# Patient Record
Sex: FEMALE | Race: OTHER
[De-identification: names, ages, dates, MRNs, and addresses within clinical notes are randomized per-mention and may not be internally consistent; named-entity substitution may affect disease eponyms.]

---

## 2018-05-31 ENCOUNTER — HOSPITAL ENCOUNTER (OUTPATIENT)
Dept: HOSPITAL 61 - ENDOS | Age: 53
Discharge: HOME | End: 2018-05-31
Attending: INTERNAL MEDICINE
Payer: COMMERCIAL

## 2018-05-31 VITALS
DIASTOLIC BLOOD PRESSURE: 77 MMHG | DIASTOLIC BLOOD PRESSURE: 77 MMHG | DIASTOLIC BLOOD PRESSURE: 77 MMHG | SYSTOLIC BLOOD PRESSURE: 146 MMHG | SYSTOLIC BLOOD PRESSURE: 146 MMHG | SYSTOLIC BLOOD PRESSURE: 146 MMHG

## 2018-05-31 DIAGNOSIS — Z90.49: ICD-10-CM

## 2018-05-31 DIAGNOSIS — Z83.3: ICD-10-CM

## 2018-05-31 DIAGNOSIS — Z82.49: ICD-10-CM

## 2018-05-31 DIAGNOSIS — Z87.891: ICD-10-CM

## 2018-05-31 DIAGNOSIS — Z79.899: ICD-10-CM

## 2018-05-31 DIAGNOSIS — M19.90: ICD-10-CM

## 2018-05-31 DIAGNOSIS — Z85.850: ICD-10-CM

## 2018-05-31 DIAGNOSIS — F41.9: ICD-10-CM

## 2018-05-31 DIAGNOSIS — K29.50: ICD-10-CM

## 2018-05-31 DIAGNOSIS — K64.0: Primary | ICD-10-CM

## 2018-05-31 PROCEDURE — 43235 EGD DIAGNOSTIC BRUSH WASH: CPT

## 2018-05-31 PROCEDURE — 45378 DIAGNOSTIC COLONOSCOPY: CPT

## 2018-05-31 RX ADMIN — SODIUM CHLORIDE, SODIUM LACTATE, POTASSIUM CHLORIDE, AND CALCIUM CHLORIDE 1 MLS/HR: .6; .31; .03; .02 INJECTION, SOLUTION INTRAVENOUS at 12:16

## 2018-11-07 ENCOUNTER — HOSPITAL ENCOUNTER (OUTPATIENT)
Dept: HOSPITAL 61 - MAMMO | Age: 53
Discharge: HOME | End: 2018-11-07
Attending: FAMILY MEDICINE
Payer: COMMERCIAL

## 2018-11-07 DIAGNOSIS — N63.21: Primary | ICD-10-CM

## 2018-11-07 PROCEDURE — 77066 DX MAMMO INCL CAD BI: CPT

## 2018-11-07 PROCEDURE — 76641 ULTRASOUND BREAST COMPLETE: CPT

## 2018-11-07 NOTE — RAD
DATE: 11/7/2018   



EXAM: MAMMO TONEY WALDEMAR CONRAD, BREAST LEFT



HISTORY: Fibrocystic disease, left breast lumps



COMPARISON: 8/1/2016   



This study was interpreted with the benefit of Computerized Aided Detection

(CAD).



Breast Density: HETERO The breast parenchyma is heterogenously dense, which

could reduce sensitivity of mammography. Breast parenchyma level C.



FINDINGS: 2-D and 3-D tomosynthesis imaging was performed in CC and MLO

projections.  Skin markers were placed on the skin surface over the areas of

palpable concern in the left breast as reported by the patient at the 12:00

and 7:00 locations.  Dense heterogeneous fibroglandular shadows are evident. 

No focal breast mass or suspicious microcalcifications are seen.  No new or

enlarging breast densities are evident.





Left breast ultrasound, 11/7/2018:



We first targeted the inferior aspect of the left breast at the 6-7:00

location.  Heterogeneous fibroglandular shadows are evident.  No mass or

abnormal fluid collection is seen.



We then targeted the area of clinical concern at the 12:00 location. 

Approximately 4 cm from the nipple there is an oval-shaped smooth hypoechoic

nodule with posterior acoustic enhancement.   It is wider than tall measuring

7 mm in length.  The appearance suggests a complicated cyst.



Slightly more laterally at the 12:00 location there is a hypoechoic nodule

measuring approximately 9 mm in greatest dimension.  It demonstrates internal

color flow.  Its margins are slightly lobulated.  There is no significant

posterior acoustic enhancement or shadowing.  This is a solid nodule.



No other abnormality was seen in this region of the left breast.





IMPRESSION: 

1.  No discrete mammographic abnormality is detected.

2.  Probable small complicated left breast cyst at 12:00 location. 

Sonographic follow-up is suggested.

3.  Small solid left breast nodule at the 12:00 location which may be a

fibroadenoma, although a circumscribed malignancy cannot be excluded. 

Ultrasound-guided biopsy is suggested for further evaluation.





Note: The findings were discussed with the patient and her  at the

time of the exam and they understand our recommendation for biopsy.  She will

follow-up with Dr. Do.







BI-RADS CATEGORY: 4 SUSPICIOUS ABNORMALITY- BIOPSY SHOULD BE CONSIDERED



RECOMMENDED FOLLOW-UP: BIO BIOPSY RECOMMENDED



PQRS compliance statement: Patient information was entered into a reminder

system with a target due date     for the next mammogram.



Mammography is a sensitive method for finding small breast cancers, but it

does not detect them all and is not a substitute for careful clinical

examination.  A negative mammogram does not negate a clinically suspicious

finding and should not result in delay in biopsying a clinically suspicious

abnormality.



"Our facility is accredited by the [employer] Mammography

Program."

## 2018-12-19 ENCOUNTER — HOSPITAL ENCOUNTER (OUTPATIENT)
Dept: HOSPITAL 61 - US | Age: 53
Discharge: HOME | End: 2018-12-19
Attending: SURGERY
Payer: COMMERCIAL

## 2018-12-19 DIAGNOSIS — R92.0: ICD-10-CM

## 2018-12-19 DIAGNOSIS — N64.89: Primary | ICD-10-CM

## 2018-12-19 DIAGNOSIS — I10: ICD-10-CM

## 2018-12-19 DIAGNOSIS — Z90.49: ICD-10-CM

## 2018-12-19 PROCEDURE — 19083 BX BREAST 1ST LESION US IMAG: CPT

## 2018-12-19 PROCEDURE — 77065 DX MAMMO INCL CAD UNI: CPT

## 2018-12-19 PROCEDURE — 76942 ECHO GUIDE FOR BIOPSY: CPT

## 2018-12-19 PROCEDURE — 19081 BX BREAST 1ST LESION STRTCTC: CPT

## 2018-12-19 PROCEDURE — 88305 TISSUE EXAM BY PATHOLOGIST: CPT

## 2018-12-19 PROCEDURE — C1713 ANCHOR/SCREW BN/BN,TIS/BN: HCPCS

## 2018-12-20 NOTE — PATHOLOGY
[facility] Accession Number: [id number]

.                                                                01

Material submitted:                                        .

LEFT BREAST MASS

.                                                                01

Clinical history:                                          .

Left breast mass, 9 mm

.                                                                02

**********************************************************************

Diagnosis:

Breast mass, left, core needle biopsy:

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

- Tiny microcalcifications present focally.

.

(Please see comment)

.

(SKM:; 12/20/2018)

MBR/12/20/2018

**********************************************************************

.                                                                02

Comment:

Review/concur:  Dr. Caruso.

.

(SKM:; 12/20/2018)

.                                                                02

Electronically signed:                                     .

Aris Gomez MD, Pathologist

NPI- [id number]

.                                                                01

Gross description:                                         .

Received in formalin labeled "Belen Mcconnell, left breast," are multiple

needle cores of yellow-gray fibrofatty tissue measuring 1.5 x 0.6 x 0.2 cm

in aggregate dimensions.  The tissue submitted in its entirety in cassette

A1 through A3.  The cold ischemic time is 10 minutes.  The total formalin

fixation time is approximately 10 hours.

(TSD; 12/19/2018)

TOB/TOB

.                                                                02

Microscopic:                                                    .

.

.                                                                02

Pathologist provided ICD-10:

D24.2

.                                                                02

CPT                                                        .

[id number]

Specimen Comment: A courtesy copy of this report has been sent to

Specimen Comment: [phone], [phone].

Specimen Comment: Report sent to DR WALSH / DR HODGES

Specimen Comment: A duplicate report has been generated due to demographic updates.

***Performed at:  01

   [address], [place], [place]  [id number]

   MD Fabián Armstrong MD Phone:  [phone]

***Performed at:  02

   [facility]

   [address], [place]  [id number]

   MD Patrice Núñez MD Phone:  [phone]

## 2018-12-21 NOTE — RAD
CLINICAL INDICATION: Left breast mass 



PRE-PROCEDURAL CONSULTATION: Details of the procedure and possible limitations

and complications were discussed with the patient. After addressing her

questions and concerns, written informed consent was obtained.  A 

was used.  A time out was then taken to verify patient's name and date of

birth as well as site and laterality.



PROCEDURE: The mass at the 12:00 position within the left breast was  targeted

under ultrasound. The skin of the left breast was cleansed and prepped in the

typical sterile fashion. 8 cc of 1% lidocaine was used for local anesthesia. A

small skin incision was then made to permit passage of a  14 gauge spring

activated biopsy  device.  5 core specimens were obtained. 



A ribbon clip was then deployed at the biopsy site. Hemostasis was achieved. 



The patient tolerated the procedure well with no immediate complications.



Post-procedural digital mammographic imaging of the left breast demonstrate

the S shaped clip in appropriate position. 



IMPRESSION:

Successful ultrasound guided core needle biopsy of a mass at the 12:00

position within the left breast.



Pathology is pending.

## 2019-10-02 ENCOUNTER — HOSPITAL ENCOUNTER (OUTPATIENT)
Dept: HOSPITAL 61 - US | Age: 54
Discharge: HOME | End: 2019-10-02
Attending: FAMILY MEDICINE
Payer: COMMERCIAL

## 2019-10-02 DIAGNOSIS — K76.0: ICD-10-CM

## 2019-10-02 DIAGNOSIS — R94.5: ICD-10-CM

## 2019-10-02 DIAGNOSIS — Z90.49: ICD-10-CM

## 2019-10-02 DIAGNOSIS — R16.0: Primary | ICD-10-CM

## 2019-10-02 PROCEDURE — 76705 ECHO EXAM OF ABDOMEN: CPT

## 2019-10-02 NOTE — RAD
EXAM: Abdomen sonogram.

 

HISTORY: Elevated liver function laboratory values.

 

TECHNIQUE: Sonographic imaging of the abdomen was performed.

 

COMPARISON: None.

 

FINDINGS: There is hepatomegaly and hepatic steatosis. No focal hepatic 

lesion is seen. The common bile duct is normal in caliber. The gallbladder

is surgically absent. The right kidney is unremarkable. The pancreas, 

aorta and inferior cava are partially obscured due to bowel gas.

 

IMPRESSION: 

1. Hepatomegaly and hepatic steatosis.

2. Cholecystectomy.

3. Partially obscured midline structures due to bowel gas.

 

Electronically signed by: Marisa Quarles MD (10/2/2019 9:20 AM) [facility]